# Patient Record
Sex: MALE | Race: BLACK OR AFRICAN AMERICAN | NOT HISPANIC OR LATINO | Employment: FULL TIME | URBAN - METROPOLITAN AREA
[De-identification: names, ages, dates, MRNs, and addresses within clinical notes are randomized per-mention and may not be internally consistent; named-entity substitution may affect disease eponyms.]

---

## 2020-03-16 ENCOUNTER — HOSPITAL ENCOUNTER (EMERGENCY)
Facility: HOSPITAL | Age: 40
Discharge: HOME | End: 2020-03-16
Attending: EMERGENCY MEDICINE
Payer: COMMERCIAL

## 2020-03-16 VITALS
TEMPERATURE: 98 F | WEIGHT: 285 LBS | RESPIRATION RATE: 18 BRPM | DIASTOLIC BLOOD PRESSURE: 96 MMHG | OXYGEN SATURATION: 97 % | HEART RATE: 88 BPM | SYSTOLIC BLOOD PRESSURE: 143 MMHG | BODY MASS INDEX: 36.59 KG/M2

## 2020-03-16 DIAGNOSIS — B34.9 VIRAL SYNDROME: Primary | ICD-10-CM

## 2020-03-16 LAB
CTP QC/QA: YES
POC MOLECULAR INFLUENZA A AGN: NEGATIVE
POC MOLECULAR INFLUENZA B AGN: NEGATIVE

## 2020-03-16 PROCEDURE — 25000003 PHARM REV CODE 250: Performed by: PHYSICIAN ASSISTANT

## 2020-03-16 PROCEDURE — 99284 EMERGENCY DEPT VISIT MOD MDM: CPT

## 2020-03-16 PROCEDURE — 99284 PR EMERGENCY DEPT VISIT,LEVEL IV: ICD-10-PCS | Mod: ,,, | Performed by: PHYSICIAN ASSISTANT

## 2020-03-16 PROCEDURE — 99284 EMERGENCY DEPT VISIT MOD MDM: CPT | Mod: ,,, | Performed by: PHYSICIAN ASSISTANT

## 2020-03-16 RX ORDER — BENZONATATE 100 MG/1
100 CAPSULE ORAL 3 TIMES DAILY PRN
Qty: 20 CAPSULE | Refills: 0 | Status: SHIPPED | OUTPATIENT
Start: 2020-03-16 | End: 2020-03-26

## 2020-03-16 RX ORDER — IBUPROFEN 800 MG/1
800 TABLET ORAL EVERY 6 HOURS PRN
Qty: 20 TABLET | Refills: 0 | Status: SHIPPED | OUTPATIENT
Start: 2020-03-16

## 2020-03-16 RX ORDER — IBUPROFEN 400 MG/1
800 TABLET ORAL
Status: COMPLETED | OUTPATIENT
Start: 2020-03-16 | End: 2020-03-16

## 2020-03-16 RX ADMIN — IBUPROFEN 800 MG: 400 TABLET, FILM COATED ORAL at 06:03

## 2020-03-16 NOTE — ED PROVIDER NOTES
Encounter Date: 3/16/2020       History     Chief Complaint   Patient presents with    malaise, chills, cough     patient reports feeling ill with a cough and body aches and chills     Patient is a 39 y.o. Male presenting to the emergency department with complaints of body aches, headache, cough and chills.  Patient states that symptoms started 3:00 a.m. this morning.  He denies taking medication for symptoms thus far.  He states that he felt as though he had a fever, but when he checked it was normal.  He denies any shortness of breath.  No chest pain.  No hypertension, diabetes.  Patient denies taking medications on a regular basis. This is the extent of the patient's complaints at this time.       The history is provided by the patient.     Review of patient's allergies indicates:   Allergen Reactions    Prilosec [omeprazole] Hives     History reviewed. No pertinent past medical history.  History reviewed. No pertinent surgical history.  History reviewed. No pertinent family history.  Social History     Tobacco Use    Smoking status: Current Every Day Smoker    Smokeless tobacco: Never Used   Substance Use Topics    Alcohol use: Yes    Drug use: Yes     Types: Marijuana     Review of Systems   Constitutional: Positive for chills. Negative for activity change, fatigue and fever.   HENT: Positive for congestion. Negative for rhinorrhea and sore throat.    Eyes: Negative for photophobia and visual disturbance.   Respiratory: Positive for cough. Negative for shortness of breath.    Cardiovascular: Negative for chest pain.   Gastrointestinal: Negative for abdominal pain, diarrhea, nausea and vomiting.   Genitourinary: Negative for dysuria, hematuria and urgency.   Musculoskeletal: Positive for myalgias. Negative for back pain and neck pain.   Skin: Negative for color change and wound.   Neurological: Positive for headaches. Negative for weakness.   Psychiatric/Behavioral: Negative for agitation and confusion.        Physical Exam     Initial Vitals   BP Pulse Resp Temp SpO2   03/16/20 1709 03/16/20 1655 03/16/20 1655 03/16/20 1655 03/16/20 1655   (!) 143/96 88 16 98.4 °F (36.9 °C) 97 %      MAP       --                Physical Exam    Nursing note and vitals reviewed.  Constitutional: He appears well-developed and well-nourished. He is not diaphoretic.  Non-toxic appearance. He does not have a sickly appearance. No distress.   Well-appearing,  male accompanied the emergency room.  Speaking clearly in full sentences.  No acute distress.   HENT:   Head: Normocephalic and atraumatic.   Right Ear: External ear normal.   Left Ear: External ear normal.   Nose: Nose normal.   Mouth/Throat: Oropharynx is clear and moist.   Eyes: Conjunctivae and EOM are normal.   Neck: Normal range of motion. Neck supple.   Cardiovascular: Normal rate, regular rhythm and normal heart sounds.   Pulmonary/Chest: Breath sounds normal. No respiratory distress. He has no wheezes.   Lungs are clear to auscultation bilaterally.  Regular rate and rhythm.   Musculoskeletal: Normal range of motion.   Neurological: He is alert and oriented to person, place, and time. GCS eye subscore is 4. GCS verbal subscore is 5. GCS motor subscore is 6.   Skin: Skin is warm.   Psychiatric: He has a normal mood and affect. His behavior is normal. Judgment and thought content normal.         ED Course   Procedures  Labs Reviewed   POCT INFLUENZA A/B MOLECULAR             Medical Decision Making:   Initial Assessment:     Urgent evaluation of a 39 y.o. male presenting to the emergency department complaining of cough, chills, body aches. Patient is afebrile, nontoxic appearing and hemodynamically stable.  Physical exam reveals regular rate rhythm, lungs are clear to auscultation bilaterally.  No focal neurological deficits or weaknesses.  Vital signs are unremarkable except for mild hypertension at 143/96.     Clinical Tests:   Lab Tests: Ordered and  Reviewed  ED Management:    Rapid influenza is negative. Patient seen for a viral-like illness, patient had a negative flu, due to the most recent recommendations from our hospital administrations/infectious disease at this time, the patient does not meet criteria for COVID 19 testing. Discussed with the patient the need to self quarantine at home given symptoms and possibility of this viral infection. Reinforced this advice and the dangers failing to comply presents to the public. Symptomatic treatment in the interim. Work note for two weeks was provided. Return precautions discussed. Vital signs did not indicate sepsis and patient was welling appear, okay for discharge home for quarantine.  Given a prescription for ibuprofen, Tessalon Perles.The patient was instructed to follow up with a primary care provider in 2 days or to return to the emergency department for worsening symptoms. The treatment plan was discussed with the patient who demonstrated understanding and comfort with plan.      This note was created using [employer]. There may be typographical errors secondary to dictation.                                    Clinical Impression:     1. Viral syndrome           Disposition:   Disposition: Discharged  Condition: Stable     ED Disposition Condition    Discharge Stable        ED Prescriptions     Medication Sig Dispense Start Date End Date Auth. Provider    ibuprofen (ADVIL,MOTRIN) 800 MG tablet Take 1 tablet (800 mg total) by mouth every 6 (six) hours as needed for Pain. 20 tablet 3/16/2020  Elida Pineda PA-C    benzonatate (TESSALON) 100 MG capsule Take 1 capsule (100 mg total) by mouth 3 (three) times daily as needed for Cough. 20 capsule 3/16/2020 3/26/2020 Elida Pineda PA-C        Follow-up Information     Follow up With Specialties Details Why Contact Info    Shad Gutiérrez MD Internal Medicine   [address] [place] [id number]  [phone]      [facility] Emergency [facility]   [facility] [facility] [id number]  [phone]                                     Elida Pineda PA-C  03/16/20 1902

## 2020-03-16 NOTE — DISCHARGE INSTRUCTIONS
I recommend good hand hygiene, social distancing (ideally staying at least 6 feet away from people), not attending public events until health authorities  otherwise. We highly recommend 14 day isolation period. This means staying at home and significantly limiting outside contact. Wear a mask around others.     For fever, cough, or other viral respiratory symptoms, I recommend supportive care measures.  This includes staying well hydrated, getting plenty of sleep, taking Tylenol or ibuprofen for fevers or pain.    So far from what we know about coronavirus, the people who get very sick tend to do so around day 7-8 of the illness.  Any severe shortness of breath, fever you cannot control, or other symptoms that you believe constitute an emergency, please return to the hospital.

## 2025-07-29 DIAGNOSIS — U07.1 COVID-19 VIRUS DETECTED: ICD-10-CM

## 2025-07-31 ENCOUNTER — NURSE TRIAGE (OUTPATIENT)
Dept: ADMINISTRATIVE | Facility: CLINIC | Age: 45
End: 2025-07-31

## 2025-08-01 NOTE — TELEPHONE ENCOUNTER
Non-[facility] PCP    Patient is enrolled in the Covid-19 Home Monitoring Program as of 7/29/25. Patient states c/o moderate coughing spells. Patient denies SOB, Chest Pain, and headache.     CARE ADVICE given per COVID-19 - DIAGNOSED OR SUSPECTED (Adult) guideline. Patient also advised to contact the [facility] for any worsening symptoms. Patient states understanding of care advice.     Reason for Disposition   [1] COVID-19 diagnosed by positive lab test (e.g., PCR, rapid self-test kit) AND [2] mild symptoms (e.g., cough, fever, others) AND [3] no complications or SOB    Additional Information   Negative: SEVERE difficulty breathing (e.g., struggling for each breath, speaks in single words)   Negative: Difficult to awaken or acting confused (e.g., disoriented, slurred speech)   Negative: Bluish (or gray) lips or face now   Negative: Shock suspected (e.g., cold/pale/clammy skin, too weak to stand, low BP, rapid pulse)   Negative: Sounds like a life-threatening emergency to the triager   Negative: [1] Diagnosed or suspected COVID-19 AND [2] symptoms lasting 3 or more weeks   Negative: [1] COVID-19 exposure AND [2] no symptoms   Negative: COVID-19 vaccine reaction suspected (e.g., fever, headache, muscle aches) occurring 1 to 3 days after getting vaccine   Negative: COVID-19 vaccine, questions about   Negative: [1] Lives with someone known to have influenza (flu test positive) AND [2] flu-like symptoms (e.g., cough, runny nose, sore throat, SOB; with or without fever)   Negative: [1] Possible COVID-19 symptoms AND [2] triager concerned about severity of symptoms or other causes   Negative: COVID-19 and breastfeeding, questions about   Negative: SEVERE or constant chest pain or pressure  (Exception: Mild central chest pain, present only when coughing.)   Negative: MODERATE difficulty breathing (e.g., speaks in phrases, SOB even at rest, pulse 100-120)   Negative: [1] Headache AND [2] stiff neck (can't touch  chin to chest)   Negative: Oxygen level (e.g., pulse oximetry) 90 percent or lower   Negative: Chest pain or pressure  (Exception: MILD central chest pain, present only when coughing.)   Negative: [1] Drinking very little AND [2] dehydration suspected (e.g., no urine > 12 hours, very dry mouth, very lightheaded)   Negative: Patient sounds very sick or weak to the triager   Negative: MILD difficulty breathing (e.g., minimal/no SOB at rest, SOB with walking, pulse <100)   Negative: Fever > 103 F (39.4 C)   Negative: [1] Fever > 101 F (38.3 C) AND [2] age > 60 years   Negative: [1] Fever > 100.0 F (37.8 C) AND [2] bedridden (e.g., CVA, chronic illness, recovering from surgery)   Negative: Oxygen level (e.g., pulse oximetry) 91 to 94 percent   Negative: [1] HIGH RISK patient (e.g., weak immune system, age > 64 years, obesity with BMI 30 or higher, pregnant, chronic lung disease or other chronic medical condition) AND [2] COVID symptoms (e.g., cough, fever)  (Exceptions: Already seen by PCP and no new or worsening symptoms.)   Negative: [1] HIGH RISK patient AND [2] influenza is widespread in the community AND [3] ONE OR MORE respiratory symptoms: cough, sore throat, runny or stuffy nose   Negative: [1] HIGH RISK patient AND [2] influenza exposure within the last 7 days AND [3] ONE OR MORE respiratory symptoms: cough, sore throat, runny or stuffy nose   Negative: Fever present > 3 days (72 hours)   Negative: [1] Fever returns after gone for over 24 hours AND [2] symptoms worse or not improved   Negative: [1] Continuous (nonstop) coughing interferes with work or school AND [2] no improvement using cough treatment per Care Advice   Negative: [1] COVID-19 infection suspected by caller or triager AND [2] mild symptoms (cough, fever, or others) AND [3] negative COVID-19 rapid test   Negative: Cough present > 3 weeks   Negative: [1] COVID-19 diagnosed by positive lab test (e.g., PCR, rapid self-test kit) AND [2] NO symptoms  (e.g., cough, fever, others)    Protocols used: Coronavirus (COVID-19) Diagnosed or [id number]